# Patient Record
Sex: MALE | Race: WHITE | Employment: PART TIME | URBAN - METROPOLITAN AREA
[De-identification: names, ages, dates, MRNs, and addresses within clinical notes are randomized per-mention and may not be internally consistent; named-entity substitution may affect disease eponyms.]

---

## 2020-01-10 ENCOUNTER — HOSPITAL ENCOUNTER (EMERGENCY)
Age: 30
Discharge: HOME | End: 2020-01-10
Attending: EMERGENCY MEDICINE

## 2020-01-10 VITALS
OXYGEN SATURATION: 98 % | SYSTOLIC BLOOD PRESSURE: 118 MMHG | TEMPERATURE: 99.9 F | WEIGHT: 210 LBS | HEART RATE: 56 BPM | DIASTOLIC BLOOD PRESSURE: 73 MMHG | BODY MASS INDEX: 27.83 KG/M2 | HEIGHT: 73 IN | RESPIRATION RATE: 16 BRPM

## 2020-01-10 LAB
A/G RATIO: 1.6 (ref 1.1–2.2)
ALBUMIN SERPL-MCNC: 4.4 G/DL (ref 3.4–5)
ALP BLD-CCNC: 49 U/L (ref 40–129)
ALT SERPL-CCNC: 16 U/L (ref 10–40)
ANION GAP SERPL CALCULATED.3IONS-SCNC: 13 MMOL/L (ref 3–16)
AST SERPL-CCNC: 47 U/L (ref 15–37)
BASOPHILS ABSOLUTE: 0 K/UL (ref 0–0.2)
BASOPHILS RELATIVE PERCENT: 0.3 %
BILIRUB SERPL-MCNC: 0.3 MG/DL (ref 0–1)
BILIRUBIN URINE: NEGATIVE
BLOOD, URINE: NEGATIVE
BUN BLDV-MCNC: 9 MG/DL (ref 7–20)
CALCIUM SERPL-MCNC: 8.4 MG/DL (ref 8.3–10.6)
CHLORIDE BLD-SCNC: 93 MMOL/L (ref 99–110)
CLARITY: CLEAR
CO2: 26 MMOL/L (ref 21–32)
COLOR: ABNORMAL
CREAT SERPL-MCNC: 0.8 MG/DL (ref 0.9–1.3)
EOSINOPHILS ABSOLUTE: 0 K/UL (ref 0–0.6)
EOSINOPHILS RELATIVE PERCENT: 0.1 %
GFR AFRICAN AMERICAN: >60
GFR NON-AFRICAN AMERICAN: >60
GLOBULIN: 2.7 G/DL
GLUCOSE BLD-MCNC: 93 MG/DL (ref 70–99)
GLUCOSE URINE: NEGATIVE MG/DL
HCT VFR BLD CALC: 37.3 % (ref 40.5–52.5)
HEMOGLOBIN: 12.7 G/DL (ref 13.5–17.5)
KETONES, URINE: 15 MG/DL
LEUKOCYTE ESTERASE, URINE: NEGATIVE
LIPASE: 21 U/L (ref 13–60)
LYMPHOCYTES ABSOLUTE: 0.6 K/UL (ref 1–5.1)
LYMPHOCYTES RELATIVE PERCENT: 26.7 %
MCH RBC QN AUTO: 29.7 PG (ref 26–34)
MCHC RBC AUTO-ENTMCNC: 34.1 G/DL (ref 31–36)
MCV RBC AUTO: 87.2 FL (ref 80–100)
MICROSCOPIC EXAMINATION: ABNORMAL
MONOCYTES ABSOLUTE: 0.3 K/UL (ref 0–1.3)
MONOCYTES RELATIVE PERCENT: 14.2 %
NEUTROPHILS ABSOLUTE: 1.2 K/UL (ref 1.7–7.7)
NEUTROPHILS RELATIVE PERCENT: 58.7 %
NITRITE, URINE: NEGATIVE
PDW BLD-RTO: 13.5 % (ref 12.4–15.4)
PH UA: 6 (ref 5–8)
PLATELET # BLD: 102 K/UL (ref 135–450)
PMV BLD AUTO: 8.2 FL (ref 5–10.5)
POTASSIUM SERPL-SCNC: 3.7 MMOL/L (ref 3.5–5.1)
PROTEIN UA: NEGATIVE MG/DL
RBC # BLD: 4.27 M/UL (ref 4.2–5.9)
SODIUM BLD-SCNC: 132 MMOL/L (ref 136–145)
SPECIFIC GRAVITY UA: <=1.005 (ref 1–1.03)
TOTAL PROTEIN: 7.1 G/DL (ref 6.4–8.2)
URINE TYPE: ABNORMAL
UROBILINOGEN, URINE: 0.2 E.U./DL
WBC # BLD: 2.1 K/UL (ref 4–11)

## 2020-01-10 PROCEDURE — 2580000003 HC RX 258: Performed by: EMERGENCY MEDICINE

## 2020-01-10 PROCEDURE — 96375 TX/PRO/DX INJ NEW DRUG ADDON: CPT

## 2020-01-10 PROCEDURE — 80053 COMPREHEN METABOLIC PANEL: CPT

## 2020-01-10 PROCEDURE — 2500000003 HC RX 250 WO HCPCS: Performed by: EMERGENCY MEDICINE

## 2020-01-10 PROCEDURE — 99283 EMERGENCY DEPT VISIT LOW MDM: CPT

## 2020-01-10 PROCEDURE — 83690 ASSAY OF LIPASE: CPT

## 2020-01-10 PROCEDURE — 81003 URINALYSIS AUTO W/O SCOPE: CPT

## 2020-01-10 PROCEDURE — 85025 COMPLETE CBC W/AUTO DIFF WBC: CPT

## 2020-01-10 PROCEDURE — 6360000002 HC RX W HCPCS: Performed by: EMERGENCY MEDICINE

## 2020-01-10 PROCEDURE — 96374 THER/PROPH/DIAG INJ IV PUSH: CPT

## 2020-01-10 RX ORDER — FAMOTIDINE 20 MG/1
20 TABLET, FILM COATED ORAL 2 TIMES DAILY PRN
Qty: 28 TABLET | Refills: 0 | Status: SHIPPED | OUTPATIENT
Start: 2020-01-10 | End: 2020-01-24

## 2020-01-10 RX ORDER — 0.9 % SODIUM CHLORIDE 0.9 %
1000 INTRAVENOUS SOLUTION INTRAVENOUS ONCE
Status: COMPLETED | OUTPATIENT
Start: 2020-01-10 | End: 2020-01-10

## 2020-01-10 RX ORDER — ONDANSETRON 4 MG/1
4 TABLET, FILM COATED ORAL 3 TIMES DAILY PRN
Qty: 15 TABLET | Refills: 0 | Status: SHIPPED | OUTPATIENT
Start: 2020-01-10

## 2020-01-10 RX ORDER — ONDANSETRON 2 MG/ML
4 INJECTION INTRAMUSCULAR; INTRAVENOUS ONCE
Status: COMPLETED | OUTPATIENT
Start: 2020-01-10 | End: 2020-01-10

## 2020-01-10 RX ADMIN — ONDANSETRON 4 MG: 2 INJECTION INTRAMUSCULAR; INTRAVENOUS at 21:11

## 2020-01-10 RX ADMIN — FAMOTIDINE 20 MG: 10 INJECTION, SOLUTION INTRAVENOUS at 21:11

## 2020-01-10 RX ADMIN — SODIUM CHLORIDE 1000 ML: 9 INJECTION, SOLUTION INTRAVENOUS at 21:11

## 2020-01-11 NOTE — ED PROVIDER NOTES
Value    WBC 2.1 (*)     Hemoglobin 12.7 (*)     Hematocrit 37.3 (*)     Platelets 611 (*)     Neutrophils Absolute 1.2 (*)     Lymphocytes Absolute 0.6 (*)     All other components within normal limits    Narrative:     Performed at:  [address], [facility][note 3] [employer]   Phone [phone]   COMPREHENSIVE METABOLIC PANEL - Abnormal; Notable for the following components:    Sodium 132 (*)     Chloride 93 (*)     CREATININE 0.8 (*)     AST 47 (*)     All other components within normal limits    Narrative:     Performed at:  [place] [address] [employer]   Phone [phone]   URINALYSIS - Abnormal; Notable for the following components:    Ketones, Urine 15 (*)     All other components within normal limits    Narrative:     Performed at:  [address], [facility][note 3] [employer]   Phone [phone]   LIPASE    Narrative:     Performed at:  [address], [facility][note 3] [employer]   Phone [phone]       Interpretation per the Radiologist below, if obtained/available at the time of this note:    No orders to display       All other labs/imaging were within normal range or not returned as of this dictation. EMERGENCY DEPARTMENT COURSE and DIFFERENTIAL DIAGNOSIS/MDM:   Vitals:    Vitals:    01/10/20 2004 01/10/20 2115 01/10/20 2221   BP: 119/75 109/65 118/73   Pulse: 77 62 56   Resp: 16 16 16   Temp: 99.9 °F (37.7 °C)     TempSrc: Oral     SpO2: 97% 98% 98%   Weight: 210 lb (95.3 kg)     Height: 6' 1\" (1.854 m)         Well-appearing male here today complaining primarily of loss of taste which I suspect is likely due to nasal congestion and lack of olfactory input when eating. Abdomen is soft. Labs show mild low white blood cell count but no concern or history of immunosuppression. Abdomen soft.   Tolerating oral intake. After Zofran and Pepcid feels markedly improved. Suspect viral etiology given the rhinorrhea, conjunctivitis nausea. MDM    CONSULTS     None    Critical Care:   None    REASSESSMENT          PROCEDURE     Unless otherwise noted below, none     Procedures      FINAL IMPRESSION      1. Non-intractable vomiting with nausea, unspecified vomiting type            DISPOSITION/PLAN   DISPOSITION Decision To Discharge 01/10/2020 10:28:52 PM        PATIENT REFERRED TO:  [facility]  ED  [facility] [address] [phone]    As needed      DISCHARGE MEDICATIONS:  New Prescriptions    FAMOTIDINE (PEPCID) 20 MG TABLET    Take 1 tablet by mouth 2 times daily as needed (abdominal pain)    ONDANSETRON (ZOFRAN) 4 MG TABLET    Take 1 tablet by mouth 3 times daily as needed for Nausea or Vomiting     Controlled Substances Monitoring:     No flowsheet data found.     (Please note that portions of this note were completed with a voice recognition program.  Efforts were made to edit the dictations but occasionally words are mis-transcribed.)    Lee Zambrano MD (electronically signed)  Attending Emergency Physician            Natividad Oakes MD  01/10/20 3206

## 2020-01-11 NOTE — ED NOTES
Patient is alert and oriented. Reports been nauseated and vomiting since Monday. Patient reports dry heaving and reports has restricted his food and liquid intact to try and resolve issue without results. Patient IV infusing at this time.  Patient report sour taste at present, no vomiting      Les Douglas, [address] [place] [address]  01/10/20 0245

## 2020-01-11 NOTE — ED NOTES
Patient passed PO challenge. Patient verbalized understanding of discharge instructions, medication and plan of care.  Patient able to ambulate independently     Heather Velazquez RN  01/10/20 4387

## 2023-03-31 ENCOUNTER — HOSPITAL ENCOUNTER (INPATIENT)
Age: 33
LOS: 3 days | Discharge: HOME | DRG: 201 | End: 2023-04-03
Attending: EMERGENCY MEDICINE | Admitting: INTERNAL MEDICINE
Payer: COMMERCIAL

## 2023-03-31 ENCOUNTER — APPOINTMENT (OUTPATIENT)
Dept: GENERAL RADIOLOGY | Age: 33
DRG: 201 | End: 2023-03-31
Payer: COMMERCIAL

## 2023-03-31 ENCOUNTER — APPOINTMENT (OUTPATIENT)
Dept: CT IMAGING | Age: 33
DRG: 201 | End: 2023-03-31
Payer: COMMERCIAL

## 2023-03-31 DIAGNOSIS — J93.9 PNEUMOTHORAX, UNSPECIFIED TYPE: Primary | ICD-10-CM

## 2023-03-31 LAB
ALBUMIN SERPL-MCNC: 5 G/DL (ref 3.4–5)
ALBUMIN/GLOB SERPL: 2.5 {RATIO} (ref 1.1–2.2)
ALP SERPL-CCNC: 58 U/L (ref 40–129)
ALT SERPL-CCNC: 26 U/L (ref 10–40)
ANION GAP SERPL CALCULATED.3IONS-SCNC: 9 MMOL/L (ref 3–16)
APTT BLD: 29.9 SEC (ref 22.7–35.9)
AST SERPL-CCNC: 24 U/L (ref 15–37)
BASOPHILS # BLD: 0.1 K/UL (ref 0–0.2)
BASOPHILS NFR BLD: 0.7 %
BILIRUB SERPL-MCNC: 0.4 MG/DL (ref 0–1)
BUN SERPL-MCNC: 13 MG/DL (ref 7–20)
CALCIUM SERPL-MCNC: 9.6 MG/DL (ref 8.3–10.6)
CHLORIDE SERPL-SCNC: 100 MMOL/L (ref 99–110)
CO2 SERPL-SCNC: 27 MMOL/L (ref 21–32)
CREAT SERPL-MCNC: 0.8 MG/DL (ref 0.9–1.3)
DEPRECATED RDW RBC AUTO: 14 % (ref 12.4–15.4)
EOSINOPHIL # BLD: 0.2 K/UL (ref 0–0.6)
EOSINOPHIL NFR BLD: 2.2 %
GFR SERPLBLD CREATININE-BSD FMLA CKD-EPI: >60 ML/MIN/{1.73_M2}
GLUCOSE SERPL-MCNC: 98 MG/DL (ref 70–99)
HCT VFR BLD AUTO: 39.6 % (ref 40.5–52.5)
HGB BLD-MCNC: 13.3 G/DL (ref 13.5–17.5)
INR PPP: 1.02 (ref 0.84–1.16)
LYMPHOCYTES # BLD: 1.2 K/UL (ref 1–5.1)
LYMPHOCYTES NFR BLD: 15.8 %
MCH RBC QN AUTO: 30.2 PG (ref 26–34)
MCHC RBC AUTO-ENTMCNC: 33.6 G/DL (ref 31–36)
MCV RBC AUTO: 89.8 FL (ref 80–100)
MONOCYTES # BLD: 0.5 K/UL (ref 0–1.3)
MONOCYTES NFR BLD: 6.8 %
NEUTROPHILS # BLD: 5.4 K/UL (ref 1.7–7.7)
NEUTROPHILS NFR BLD: 74.5 %
PLATELET # BLD AUTO: 188 K/UL (ref 135–450)
PMV BLD AUTO: 7.4 FL (ref 5–10.5)
POTASSIUM SERPL-SCNC: 4.2 MMOL/L (ref 3.5–5.1)
POTASSIUM SERPL-SCNC: 4.2 MMOL/L (ref 3.5–5.1)
PROT SERPL-MCNC: 7 G/DL (ref 6.4–8.2)
PROTHROMBIN TIME: 13.4 SEC (ref 11.5–14.8)
RBC # BLD AUTO: 4.42 M/UL (ref 4.2–5.9)
SODIUM SERPL-SCNC: 136 MMOL/L (ref 136–145)
WBC # BLD AUTO: 7.3 K/UL (ref 4–11)

## 2023-03-31 PROCEDURE — 6370000000 HC RX 637 (ALT 250 FOR IP): Performed by: PHYSICIAN ASSISTANT

## 2023-03-31 PROCEDURE — 0W9B30Z DRAINAGE OF LEFT PLEURAL CAVITY WITH DRAINAGE DEVICE, PERCUTANEOUS APPROACH: ICD-10-PCS | Performed by: STUDENT IN AN ORGANIZED HEALTH CARE EDUCATION/TRAINING PROGRAM

## 2023-03-31 PROCEDURE — 96374 THER/PROPH/DIAG INJ IV PUSH: CPT

## 2023-03-31 PROCEDURE — 85025 COMPLETE CBC W/AUTO DIFF WBC: CPT

## 2023-03-31 PROCEDURE — C1729 CATH, DRAINAGE: HCPCS

## 2023-03-31 PROCEDURE — 6360000002 HC RX W HCPCS: Performed by: EMERGENCY MEDICINE

## 2023-03-31 PROCEDURE — 6360000002 HC RX W HCPCS: Performed by: STUDENT IN AN ORGANIZED HEALTH CARE EDUCATION/TRAINING PROGRAM

## 2023-03-31 PROCEDURE — 80053 COMPREHEN METABOLIC PANEL: CPT

## 2023-03-31 PROCEDURE — 85730 THROMBOPLASTIN TIME PARTIAL: CPT

## 2023-03-31 PROCEDURE — 2060000000 HC ICU INTERMEDIATE R&B

## 2023-03-31 PROCEDURE — 6360000002 HC RX W HCPCS: Performed by: PHYSICIAN ASSISTANT

## 2023-03-31 PROCEDURE — 99285 EMERGENCY DEPT VISIT HI MDM: CPT

## 2023-03-31 PROCEDURE — 71046 X-RAY EXAM CHEST 2 VIEWS: CPT

## 2023-03-31 PROCEDURE — 85610 PROTHROMBIN TIME: CPT

## 2023-03-31 PROCEDURE — 77012 CT SCAN FOR NEEDLE BIOPSY: CPT

## 2023-03-31 RX ORDER — ACETAMINOPHEN 325 MG/1
650 TABLET ORAL EVERY 6 HOURS PRN
Status: DISCONTINUED | OUTPATIENT
Start: 2023-03-31 | End: 2023-04-03 | Stop reason: HOSPADM

## 2023-03-31 RX ORDER — ACETAMINOPHEN 650 MG/1
650 SUPPOSITORY RECTAL EVERY 6 HOURS PRN
Status: DISCONTINUED | OUTPATIENT
Start: 2023-03-31 | End: 2023-04-03 | Stop reason: HOSPADM

## 2023-03-31 RX ORDER — FENTANYL CITRATE 50 UG/ML
INJECTION, SOLUTION INTRAMUSCULAR; INTRAVENOUS
Status: COMPLETED | OUTPATIENT
Start: 2023-03-31 | End: 2023-03-31

## 2023-03-31 RX ORDER — MIDAZOLAM HYDROCHLORIDE 1 MG/ML
INJECTION INTRAMUSCULAR; INTRAVENOUS
Status: COMPLETED | OUTPATIENT
Start: 2023-03-31 | End: 2023-03-31

## 2023-03-31 RX ORDER — MORPHINE SULFATE 2 MG/ML
2 INJECTION, SOLUTION INTRAMUSCULAR; INTRAVENOUS ONCE
Status: COMPLETED | OUTPATIENT
Start: 2023-03-31 | End: 2023-03-31

## 2023-03-31 RX ORDER — SODIUM CHLORIDE 9 MG/ML
INJECTION, SOLUTION INTRAVENOUS PRN
Status: DISCONTINUED | OUTPATIENT
Start: 2023-03-31 | End: 2023-04-03 | Stop reason: HOSPADM

## 2023-03-31 RX ORDER — ENOXAPARIN SODIUM 100 MG/ML
40 INJECTION SUBCUTANEOUS DAILY
Status: DISCONTINUED | OUTPATIENT
Start: 2023-03-31 | End: 2023-04-03 | Stop reason: HOSPADM

## 2023-03-31 RX ORDER — ONDANSETRON 2 MG/ML
4 INJECTION INTRAMUSCULAR; INTRAVENOUS EVERY 6 HOURS PRN
Status: DISCONTINUED | OUTPATIENT
Start: 2023-03-31 | End: 2023-04-03 | Stop reason: HOSPADM

## 2023-03-31 RX ORDER — SODIUM CHLORIDE 0.9 % (FLUSH) 0.9 %
5-40 SYRINGE (ML) INJECTION EVERY 12 HOURS SCHEDULED
Status: DISCONTINUED | OUTPATIENT
Start: 2023-03-31 | End: 2023-04-03 | Stop reason: HOSPADM

## 2023-03-31 RX ORDER — ONDANSETRON 4 MG/1
4 TABLET, ORALLY DISINTEGRATING ORAL EVERY 8 HOURS PRN
Status: DISCONTINUED | OUTPATIENT
Start: 2023-03-31 | End: 2023-04-03 | Stop reason: HOSPADM

## 2023-03-31 RX ORDER — SODIUM CHLORIDE 0.9 % (FLUSH) 0.9 %
5-40 SYRINGE (ML) INJECTION PRN
Status: DISCONTINUED | OUTPATIENT
Start: 2023-03-31 | End: 2023-04-03 | Stop reason: HOSPADM

## 2023-03-31 RX ORDER — POLYETHYLENE GLYCOL 3350 17 G/17G
17 POWDER, FOR SOLUTION ORAL DAILY PRN
Status: DISCONTINUED | OUTPATIENT
Start: 2023-03-31 | End: 2023-04-03 | Stop reason: HOSPADM

## 2023-03-31 RX ADMIN — ACETAMINOPHEN 650 MG: 325 TABLET ORAL at 18:28

## 2023-03-31 RX ADMIN — FENTANYL CITRATE 25 MCG: 50 INJECTION, SOLUTION INTRAMUSCULAR; INTRAVENOUS at 15:42

## 2023-03-31 RX ADMIN — FENTANYL CITRATE 25 MCG: 50 INJECTION, SOLUTION INTRAMUSCULAR; INTRAVENOUS at 15:40

## 2023-03-31 RX ADMIN — MORPHINE SULFATE 2 MG: 2 INJECTION, SOLUTION INTRAMUSCULAR; INTRAVENOUS at 11:28

## 2023-03-31 RX ADMIN — ENOXAPARIN SODIUM 40 MG: 100 INJECTION SUBCUTANEOUS at 17:11

## 2023-03-31 RX ADMIN — MIDAZOLAM HYDROCHLORIDE 0.5 MG: 2 INJECTION, SOLUTION INTRAMUSCULAR; INTRAVENOUS at 15:42

## 2023-03-31 RX ADMIN — MIDAZOLAM HYDROCHLORIDE 0.5 MG: 2 INJECTION, SOLUTION INTRAMUSCULAR; INTRAVENOUS at 15:40

## 2023-03-31 RX ADMIN — MIDAZOLAM HYDROCHLORIDE 1 MG: 2 INJECTION, SOLUTION INTRAMUSCULAR; INTRAVENOUS at 15:34

## 2023-03-31 RX ADMIN — FENTANYL CITRATE 50 MCG: 50 INJECTION, SOLUTION INTRAMUSCULAR; INTRAVENOUS at 15:34

## 2023-03-31 ASSESSMENT — PAIN DESCRIPTION - ORIENTATION: ORIENTATION: LEFT;POSTERIOR

## 2023-03-31 ASSESSMENT — PAIN SCALES - GENERAL
PAINLEVEL_OUTOF10: 0
PAINLEVEL_OUTOF10: 3
PAINLEVEL_OUTOF10: 3
PAINLEVEL_OUTOF10: 2
PAINLEVEL_OUTOF10: 6

## 2023-03-31 ASSESSMENT — PAIN DESCRIPTION - FREQUENCY: FREQUENCY: INTERMITTENT

## 2023-03-31 ASSESSMENT — PAIN DESCRIPTION - ONSET: ONSET: ON-GOING

## 2023-03-31 ASSESSMENT — PAIN - FUNCTIONAL ASSESSMENT
PAIN_FUNCTIONAL_ASSESSMENT: PREVENTS OR INTERFERES SOME ACTIVE ACTIVITIES AND ADLS
PAIN_FUNCTIONAL_ASSESSMENT: 0-10

## 2023-03-31 ASSESSMENT — PAIN DESCRIPTION - PAIN TYPE: TYPE: ACUTE PAIN

## 2023-03-31 ASSESSMENT — PAIN DESCRIPTION - DESCRIPTORS: DESCRIPTORS: ACHING;DISCOMFORT

## 2023-03-31 ASSESSMENT — PAIN DESCRIPTION - LOCATION: LOCATION: CHEST

## 2023-03-31 NOTE — PRE SEDATION
Sedation Pre-Procedure Note    Patient Name: Eva Ruiz   YOB: 1990  Room/Bed: 03/03  Medical Record Number: [id number]  Date: 3/31/2023   Time: 3:26 PM       Indication:  pt with moderate left pneumothorax here for left chest tube placement. Consent: I have discussed with the patient and/or the patient representative the indication, alternatives, and the possible risks and/or complications of the planned procedure and the anesthesia methods. The patient and/or patient representative appear to understand and agree to proceed. Vital Signs:   Vitals:    03/31/23 1402   BP: 109/69   Pulse: 68   Resp: 14   Temp:    SpO2: 100%       Past Medical History:   has no past medical history on file. Past Surgical History:   has no past surgical history on file. Medications:   Scheduled Meds:   Continuous Infusions:   PRN Meds:   Home Meds:   Prior to Admission medications    Medication Sig Start Date End Date Taking? Authorizing Provider   ondansetron (ZOFRAN) 4 MG tablet Take 1 tablet by mouth 3 times daily as needed for Nausea or Vomiting  Patient not taking: Reported on 3/31/2023 1/10/20   Julianna Martínez MD   famotidine (PEPCID) 20 MG tablet Take 1 tablet by mouth 2 times daily as needed (abdominal pain) 1/10/20 1/24/20  Julianna Martínez MD     Coumadin Use Last 7 Days:  no  Antiplatelet drug therapy use last 7 days: no  Other anticoagulant use last 7 days: no  Additional Medication Information:  none     Pre-Sedation Documentation and Exam:   I have reviewed the patient's history and review of systems.     Mallampati Airway Assessment:  Mallampati Class II - (soft palate, fauces & uvula are visible)    Prior History of Anesthesia Complications:   none    ASA Classification:  Class 3 - A patient with severe systemic disease that limits activity but is not incapacitating    Sedation/ Anesthesia Plan:   intravenous sedation    Medications Planned:   midazolam (Versed) intravenously and fentanyl

## 2023-03-31 NOTE — OR NURSING
Pt arrived for image guided chest tube insertion left . Procedure explained including the risk and benefits of the procedure. All questions answered. Pt verbalizes understanding of the procedure and states no more questions. Consent confirmed. Vital signs stable. Labs, allergies, medications, and code status reviewed. No contraindications noted. Vital Signs  Vitals:    03/31/23 1528   BP: 120/64   Pulse: 65   Resp: 12   Temp:    SpO2: 94%    (vital signs in table format)    Pre Sharon Score  2 - Able to move 4 extremities voluntarily on command  2 - BP+/- 20mmHg of normal  2 - Fully awake  2 - Able to maintain oxygen saturation >92% on room air  2 - Able to breathe deeply and cough freely    Allergies  Patient has no known allergies.  (allergies)    Labs  Lab Results   Component Value Date    INR 1.02 03/31/2023    PROTIME 13.4 03/31/2023     Lab Results   Component Value Date    CREATININE 0.8 (L) 03/31/2023    BUN 13 03/31/2023     03/31/2023    K 4.2 03/31/2023    K 4.2 03/31/2023     03/31/2023    CO2 27 03/31/2023     Lab Results   Component Value Date    WBC 7.3 03/31/2023    HGB 13.3 (L) 03/31/2023    HCT 39.6 (L) 03/31/2023    MCV 89.8 03/31/2023     03/31/2023

## 2023-03-31 NOTE — ED PROVIDER NOTES
I independently examined and evaluated Maximilian Castellano. In brief, is a 80-year-old male presents emergency department for evaluation of shortness of breath. Patient reports having history of pneumothorax. Started having left-sided pain and shortness of breath today. On my examination, patient is resting comfortably. No increased work of breathing. Satting 90% on room air. Chest x-ray shows left-sided apical pneumothorax. Thorax is small. It is in the apical region and will be difficult to access as it is by the subclavian. CT surgery initially consulted who recommended consulting IR. Spoke with IR and they are at [place] and will not be available. Given this, hospitalist consulted for admission for further evaluation and treatment. Admit. ICD-10-CM    1. Pneumothorax, unspecified type  J93.9           All diagnostic, treatment, and disposition decisions were made by myself in conjunction with the advanced practice provider/resident physician. I personally saw the patient and performed a substantive portion of the visit including aspects of the medical decision making. I personally saw the patient and independently provided 10 minutes of non-concurrent critical care out of the total shared critical care time provided. Comment: Please note this report has been produced using speech recognition software and may contain errors related to that system including errors in grammar, punctuation, and spelling, as well as words and phrases that may be inappropriate. If there are any questions or concerns please feel free to contact the dictating provider for clarification. For all further details of the patient's emergency department visit, please see the advanced practice provider's documentation.        Alec Ruiz MD  03/31/23 8537

## 2023-03-31 NOTE — PLAN OF CARE
Received ED page for admission.   Sent to [facility] admitting hospitalist [place]

## 2023-03-31 NOTE — LETTER
April 3, 2023       Tee Shukla YOB: 1990   [address] [facility] Date of Visit:  3/31/2023       To Whom It May Concern: It is my medical opinion that Tee Shukla may return to work on 4/10/2023 . If you have any questions or concerns, please don't hesitate to call.     Sincerely,      Jose Mari RN

## 2023-03-31 NOTE — OR NURSING
Image guided chest tube insertion left completed. Dr. Abraham Lynn placed 8 Peruvian 25 cm Angiodynamics Exodus Array multipurpose drainage catheter LOT # [id number] EXP 4/30/2025 in the left. Drain/tube secured at the 18 cm line with SUTURES. Drain/tube dressing clean, dry, and intact. Pt tolerated procedure without any signs or symptoms of distress. Vital signs stable. Report called to  RN. Pt transported back to ED in stable condition via bed by THIS RN.      Medications  Versed: 2 mg  Fentanyl: 100 mcg    Vital Signs  Vitals:    03/31/23 1549   BP: 126/72   Pulse: 53   Resp: 10   Temp:    SpO2: 100%    (vital signs in table format)    Post Sharon  2 - Able to move 4 extremities voluntarily on command  2 - BP+/- 20mmHg of normal  2 - Fully awake  2 - Able to maintain oxygen saturation >92% on room air  2 - Able to breathe deeply and cough freely

## 2023-03-31 NOTE — H&P
Hospital Medicine History & Physical      PCP: No primary care provider on file. Date of Admission: 3/31/2023    Date of Service: Pt seen/examined on 3/31/2023 and Admitted to Inpatient with expected LOS greater than two midnights due to medical therapy. Chief Complaint:    Shortness of breath      History Of Present Illness:   35 y.o. male who presented to [facility] with complaints of shortness of breath. Patient states he progressively developed shortness of breath this morning. He thought he pulled a muscle along his left chest wall at work. Chest x-ray in ED revealed a 3 cm apical pneumothorax. CT surgery and IR consulted for possible chest tube placement. Patient reports he has had a previous pneumothorax on the same side about 2 years ago after heavy lifting at work. He states the pain feels similar to his last pneumothorax. pleuritic pain. Rates it as 6 out of 10. Currently on room air of oxygen saturations in high 90s. Still smokes a pack a day. Denies fevers, chills, chest pain, lightheaded or dizziness, abdominal pain, nausea or vomiting, or focal neurologic deficit. Past Medical History:      History reviewed. No pertinent past medical history. Past Surgical History:      History reviewed. No pertinent surgical history. Medications Prior to Admission:      Prior to Admission medications    Medication Sig Start Date End Date Taking? Authorizing Provider   ondansetron (ZOFRAN) 4 MG tablet Take 1 tablet by mouth 3 times daily as needed for Nausea or Vomiting  Patient not taking: Reported on 3/31/2023 1/10/20   Shaq Pate MD   famotidine (PEPCID) 20 MG tablet Take 1 tablet by mouth 2 times daily as needed (abdominal pain) 1/10/20 1/24/20  Shaq Pate MD       Allergies:  Patient has no known allergies. Social History:      The patient currently lives home    TOBACCO:   reports that he has been smoking cigarettes.  He has been smoking an average of .5 packs

## 2023-03-31 NOTE — ED PROVIDER NOTES
Emergency Department Provider Note  Location: [facility]  ED  3/31/2023     Patient Name: Elmira Lange  MRN: [id number]  YOB: 1990  Date of evaluation: 3/31/2023  Provider: Karla Knapp MD  PCP: No primary care provider on file. CHIEF COMPLAINT  Shortness of Breath (Left side pain, Hx of pneumothorax)       HISTORY & PHYSICAL     HISTORY OF PRESENT ILLNESS  Elmira Lange is a 35 y.o. male with a past medical history of pneumothorax 2 years ago, who presents to the ED complaining of left-sided pleuritic chest pain. He notably had his last pneumothorax while at work doing heavy lifting. 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 Patient reports he felt like he \"pulled a muscle\" of his left side, and that the pain is very similar to pain he had with his previous pneumothorax. Patient woke up this morning reporting of numbness of skin of his left chest with slight trouble breathing. Numbness has since improved but pain persist.  Pain is worsened when patient leans forward as well as takes a deep breath. Pain is localized to his upper left chest.  Does report some pain in his back but this is not as severe as his chest pain. At rest, patient reports no pain. However when pain does occur, it is sharp and uncomfortable, rated as a 5 out of 10. He has not taken any medication for his pain. Denies fever, chills, headache, nausea, vomiting, dysuria, Marek frequency, constipation or diarrhea. Denies history of blood clots or active malignancy. Patient denies unilateral leg swelling, hemoptysis, recent travel or surgery/immobilization, or hormone use. They report that their most recent cardiac evaluation was: never    Patient does smoke.      Old records reviewed:  Hospitalized August 2020 for spontaneous pneumothorax at [address] [place] [address], chest tube placement by CT surgery and chest tube

## 2023-03-31 NOTE — BRIEF OP NOTE
Brief Postoperative Note    Jef Burciaga  YOB: 1990  [id number]    Pre-operative Diagnosis: spontaneous pneumothorax    Post-operative Diagnosis: Same    Procedure: image guided left chest tube placement    Anesthesia: Local and Moderate Sedation    Surgeons/Assistants: Praveen Robertson MD    Estimated Blood Loss: less than 5    Complications: None    Specimens: Was Not Obtained    Findings: successful placement of left 8F chest tube into moderate sized left pneumothorax    Electronically signed by Praveen Robertson MD on 3/31/2023 at 3:52 PM

## 2023-04-01 ENCOUNTER — APPOINTMENT (OUTPATIENT)
Dept: GENERAL RADIOLOGY | Age: 33
DRG: 201 | End: 2023-04-01
Payer: COMMERCIAL

## 2023-04-01 LAB
ANION GAP SERPL CALCULATED.3IONS-SCNC: 10 MMOL/L (ref 3–16)
BASOPHILS # BLD: 0 K/UL (ref 0–0.2)
BASOPHILS NFR BLD: 0.7 %
BUN SERPL-MCNC: 15 MG/DL (ref 7–20)
CALCIUM SERPL-MCNC: 8.8 MG/DL (ref 8.3–10.6)
CHLORIDE SERPL-SCNC: 102 MMOL/L (ref 99–110)
CO2 SERPL-SCNC: 28 MMOL/L (ref 21–32)
CREAT SERPL-MCNC: 1 MG/DL (ref 0.9–1.3)
DEPRECATED RDW RBC AUTO: 14.2 % (ref 12.4–15.4)
EOSINOPHIL # BLD: 0.3 K/UL (ref 0–0.6)
EOSINOPHIL NFR BLD: 3.9 %
GFR SERPLBLD CREATININE-BSD FMLA CKD-EPI: >60 ML/MIN/{1.73_M2}
GLUCOSE SERPL-MCNC: 107 MG/DL (ref 70–99)
HCT VFR BLD AUTO: 38.1 % (ref 40.5–52.5)
HGB BLD-MCNC: 12.8 G/DL (ref 13.5–17.5)
LYMPHOCYTES # BLD: 2.3 K/UL (ref 1–5.1)
LYMPHOCYTES NFR BLD: 35.6 %
MCH RBC QN AUTO: 30.3 PG (ref 26–34)
MCHC RBC AUTO-ENTMCNC: 33.7 G/DL (ref 31–36)
MCV RBC AUTO: 90.1 FL (ref 80–100)
MONOCYTES # BLD: 0.5 K/UL (ref 0–1.3)
MONOCYTES NFR BLD: 7.5 %
NEUTROPHILS # BLD: 3.4 K/UL (ref 1.7–7.7)
NEUTROPHILS NFR BLD: 52.3 %
PLATELET # BLD AUTO: 177 K/UL (ref 135–450)
PMV BLD AUTO: 8.2 FL (ref 5–10.5)
POTASSIUM SERPL-SCNC: 3.7 MMOL/L (ref 3.5–5.1)
RBC # BLD AUTO: 4.22 M/UL (ref 4.2–5.9)
SODIUM SERPL-SCNC: 140 MMOL/L (ref 136–145)
WBC # BLD AUTO: 6.6 K/UL (ref 4–11)

## 2023-04-01 PROCEDURE — 36415 COLL VENOUS BLD VENIPUNCTURE: CPT

## 2023-04-01 PROCEDURE — 2060000000 HC ICU INTERMEDIATE R&B

## 2023-04-01 PROCEDURE — 85025 COMPLETE CBC W/AUTO DIFF WBC: CPT

## 2023-04-01 PROCEDURE — 80048 BASIC METABOLIC PNL TOTAL CA: CPT

## 2023-04-01 PROCEDURE — 71046 X-RAY EXAM CHEST 2 VIEWS: CPT

## 2023-04-01 PROCEDURE — 6360000002 HC RX W HCPCS: Performed by: PHYSICIAN ASSISTANT

## 2023-04-01 PROCEDURE — 2580000003 HC RX 258: Performed by: PHYSICIAN ASSISTANT

## 2023-04-01 RX ADMIN — SODIUM CHLORIDE, PRESERVATIVE FREE 10 ML: 5 INJECTION INTRAVENOUS at 08:26

## 2023-04-01 RX ADMIN — SODIUM CHLORIDE, PRESERVATIVE FREE 10 ML: 5 INJECTION INTRAVENOUS at 21:37

## 2023-04-01 RX ADMIN — ENOXAPARIN SODIUM 40 MG: 100 INJECTION SUBCUTANEOUS at 08:25

## 2023-04-01 NOTE — PLAN OF CARE
Problem: Discharge Planning  Goal: Discharge to home or other facility with appropriate resources  Outcome: Progressing  Flowsheets (Taken 3/31/2023 1615 by Nataliya Hill, RN)  Discharge to home or other facility with appropriate resources: Identify barriers to discharge with patient and caregiver     Problem: Pain  Goal: Verbalizes/displays adequate comfort level or baseline comfort level  3/31/2023 2319 by Kai Almaraz RN  Outcome: Progressing  3/31/2023 1629 by Nataliya Hill RN  Outcome: Progressing     Problem: Safety - Adult  Goal: Free from fall injury  3/31/2023 2319 by Kai Almaraz RN  Outcome: Progressing  3/31/2023 1629 by Nataliya Hill RN  Outcome: Progressing

## 2023-04-01 NOTE — PLAN OF CARE
Problem: Discharge Planning  Goal: Discharge to home or other facility with appropriate resources  4/1/2023 0947 by David Romo RN  Outcome: Progressing  3/31/2023 2319 by Skylar Melendez RN  Outcome: Progressing  Flowsheets (Taken 3/31/2023 1615 by Nyla Cortez RN)  Discharge to home or other facility with appropriate resources: Identify barriers to discharge with patient and caregiver     Problem: Pain  Goal: Verbalizes/displays adequate comfort level or baseline comfort level  4/1/2023 0947 by David Romo RN  Outcome: Progressing  3/31/2023 2319 by Skylar Melenedz RN  Outcome: Progressing     Problem: Safety - Adult  Goal: Free from fall injury  4/1/2023 0947 by David Romo RN  Outcome: Progressing  3/31/2023 2319 by Skylar Melendez RN  Outcome: Progressing

## 2023-04-01 NOTE — FLOWSHEET NOTE
04/01/23 0822   Vitals   Temp 98.1 °F (36.7 °C)   Temp Source Oral   Heart Rate 60   Heart Rate Source Monitor   Resp 17   BP 98/60   MAP (Calculated) 73   MAP (mmHg) 73   BP Location Left upper arm   BP Upper/Lower Upper   BP Method Automatic   Patient Position Sitting   Level of Consciousness 0   MEWS Score 2   Cardiac Rhythm Sinus adri   Pain Assessment   Pain Assessment None - Denies Pain   Oxygen Therapy   SpO2 100 %   Pulse Oximetry Type Intermittent   Pulse Oximeter Device Mode Intermittent   Pulse Oximeter Device Location Finger   O2 Device None (Room air)     Pt A/O x4, follows commands and responds appropriately. Was resting when RN entered room. Denies any pain at this time. CT dressing C/D/I, to LWS at -20. No s/s of distress, VSS. See flowsheet and eMar for additional documentation.

## 2023-04-02 ENCOUNTER — APPOINTMENT (OUTPATIENT)
Dept: GENERAL RADIOLOGY | Age: 33
DRG: 201 | End: 2023-04-02
Payer: COMMERCIAL

## 2023-04-02 PROCEDURE — 6360000002 HC RX W HCPCS: Performed by: PHYSICIAN ASSISTANT

## 2023-04-02 PROCEDURE — 71045 X-RAY EXAM CHEST 1 VIEW: CPT

## 2023-04-02 PROCEDURE — 2580000003 HC RX 258: Performed by: PHYSICIAN ASSISTANT

## 2023-04-02 PROCEDURE — 2060000000 HC ICU INTERMEDIATE R&B

## 2023-04-02 RX ADMIN — SODIUM CHLORIDE, PRESERVATIVE FREE 10 ML: 5 INJECTION INTRAVENOUS at 20:31

## 2023-04-02 RX ADMIN — SODIUM CHLORIDE, PRESERVATIVE FREE 10 ML: 5 INJECTION INTRAVENOUS at 09:30

## 2023-04-02 RX ADMIN — ENOXAPARIN SODIUM 40 MG: 100 INJECTION SUBCUTANEOUS at 09:30

## 2023-04-02 ASSESSMENT — PAIN SCALES - GENERAL: PAINLEVEL_OUTOF10: 0

## 2023-04-02 NOTE — PLAN OF CARE
Problem: Pain  Goal: Verbalizes/displays adequate comfort level or baseline comfort level  4/2/2023 0906 by Sully Marte RN  Outcome: Progressing  Note: Pt will be satisfied with pain control. Pt uses numeric pain rating scale with reassessments after pain med administration. Will continue to monitor progression throughout shift. Problem: Safety - Adult  Goal: Free from fall injury  4/2/2023 0906 by Sully Marte RN  Outcome: Progressing  Note: Pt will remain free from falls throughout hospital stay. Fall precautions in place,  bed in lowest position with wheels locked and side rails 2/4 up. Room door open and hourly rounding completed. Will continue to monitor throughout shift.

## 2023-04-02 NOTE — PLAN OF CARE
Problem: Discharge Planning  Goal: Discharge to home or other facility with appropriate resources  4/1/2023 2153 by Junior Prabhakar RN  Outcome: Progressing  Flowsheets (Taken 4/1/2023 2153)  Discharge to home or other facility with appropriate resources:   Identify barriers to discharge with patient and caregiver   Identify discharge learning needs (meds, wound care, etc)   Refer to discharge planning if patient needs post-hospital services based on physician order or complex needs related to functional status, cognitive ability or social support system   Arrange for needed discharge resources and transportation as appropriate     Problem: Pain  Goal: Verbalizes/displays adequate comfort level or baseline comfort level  4/1/2023 2153 by Junior Prabhakar RN  Outcome: Progressing  Note: Pt reporting  0 /10 pain during pain assessment. Treating pain prn. Pt reports current pain plan is working well. Instructed pt to report any new onsets of pain. Will continue to monitor. Problem: Safety - Adult  Goal: Free from fall injury  4/1/2023 2153 by Junior Prabhakar RN  Outcome: Progressing  Note: Pt at risk for accidental injury. Pt will remain free from injury during admission. Pt educated on the use of the call light and the need to have an active bed alarm. Pt will call out prior to ambulation. Pt's pathway will remain free from tripping hazards. Pt's bed is in lowest position and call light in reach. Pt has no further needs at this time. Will continue to monitor.

## 2023-04-03 VITALS
TEMPERATURE: 97.9 F | BODY MASS INDEX: 27.83 KG/M2 | DIASTOLIC BLOOD PRESSURE: 66 MMHG | HEART RATE: 60 BPM | RESPIRATION RATE: 16 BRPM | SYSTOLIC BLOOD PRESSURE: 114 MMHG | WEIGHT: 210 LBS | HEIGHT: 73 IN | OXYGEN SATURATION: 99 %

## 2023-04-03 PROCEDURE — 6360000002 HC RX W HCPCS: Performed by: PHYSICIAN ASSISTANT

## 2023-04-03 PROCEDURE — 2580000003 HC RX 258: Performed by: PHYSICIAN ASSISTANT

## 2023-04-03 RX ADMIN — ENOXAPARIN SODIUM 40 MG: 100 INJECTION SUBCUTANEOUS at 09:33

## 2023-04-03 RX ADMIN — SODIUM CHLORIDE, PRESERVATIVE FREE 10 ML: 5 INJECTION INTRAVENOUS at 09:34

## 2023-04-03 NOTE — DISCHARGE SUMMARY
thoracostomy tube placement, now trace. CT GUIDED PLEURAL DRAINAGE W [facility]   Final Result   Successful CT guided placement of a left 8 French chest tube. CT GUIDED NEEDLE PLACEMENT   Final Result   Successful CT guided placement of a left 8 French chest tube. XR CHEST (2 VW)   Final Result   Left-sided pneumothorax as described above. No tension morphology at this   time. XR CHEST STANDARD (2 VW)    (Results Pending)        Consults:     IP CONSULT TO CARDIOTHORACIC SURGERY  IP CONSULT TO INTERVENTIONAL RADIOLOGY  IP CONSULT TO HOSPITALIST    Disposition: home     Condition at Discharge: Stable    Discharge Instructions/Follow-up:  Follow up with PCP in 7-10 days. CXR in 2 days    Code Status:Full Code     Activity: activity as tolerated    Diet: regular diet      Discharge Medications:     Current Discharge Medication List             Details   famotidine (PEPCID) 20 MG tablet Take 1 tablet by mouth 2 times daily as needed (abdominal pain)  Qty: 28 tablet, Refills: 0             Time Spent on discharge: 37 minutes in the examination, evaluation, counseling and review of medications and discharge plan. Signed:    WIL Kessler CNP   4/3/2023      Thank you No primary care provider on file. for the opportunity to be involved in this patient's care. If you have any questions or concerns, please feel free to contact me at [phone].

## 2023-04-03 NOTE — PROGRESS NOTES
Chest tube now to water seal per verbal order from [facility].
Pt was given discharge instructions alone with cxr order and they were explained to patient and significant other. Verbalized understanding and no questions. Pt refused wheelchair and walked out with significant other.
venous distention. Trachea midline. Respiratory:  Normal respiratory effort. Clear to auscultation, bilaterally without Rales/Wheezes/Rhonchi. Cardiovascular: Regular rate and rhythm with normal S1/S2 without murmurs, rubs or gallops. Abdomen: Soft, non-tender, non-distended with normal bowel sounds. Musculoskeletal: No clubbing, cyanosis or edema bilaterally. Full range of motion without deformity. Skin: Skin color, texture, turgor normal.  No rashes or lesions. Neurologic:  Neurovascularly intact without any focal sensory/motor deficits. Cranial nerves: II-XII intact, grossly non-focal.  Psychiatric: Alert and oriented, thought content appropriate, normal insight  Capillary Refill: Brisk, 3 seconds, normal   Peripheral Pulses: +2 palpable, equal bilaterally       Labs:   Recent Labs     03/31/23  1041 04/01/23  0525   WBC 7.3 6.6   HGB 13.3* 12.8*   HCT 39.6* 38.1*    177     Recent Labs     03/31/23  1041 04/01/23  0525    140   K 4.2  4.2 3.7    102   CO2 27 28   BUN 13 15   CREATININE 0.8* 1.0   CALCIUM 9.6 8.8     Recent Labs     03/31/23  1041   AST 24   ALT 26   BILITOT 0.4   ALKPHOS 58     Recent Labs     03/31/23  1041   INR 1.02     No results for input(s): Kristin Salazar in the last 72 hours. Urinalysis:      Lab Results   Component Value Date/Time    NITRU Negative 01/10/2020 08:07 PM    BLOODU Negative 01/10/2020 08:07 PM    SPECGRAV <=1.005 01/10/2020 08:07 PM    GLUCOSEU Negative 01/10/2020 08:07 PM       Radiology:  CT GUIDED PLEURAL DRAINAGE W CATH PERC   Final Result   Successful CT guided placement of a left 8 Romanian chest tube. CT GUIDED NEEDLE PLACEMENT   Final Result   Successful CT guided placement of a left 8 Romanian chest tube. XR CHEST (2 VW)   Final Result   Left-sided pneumothorax as described above. No tension morphology at this   time.          XR CHEST (2 VW)    (Results Pending)       IP CONSULT TO CARDIOTHORACIC SURGERY  IP CONSULT TO
PLACEMENT   Final Result   Successful CT guided placement of a left 8 Eritrean chest tube. XR CHEST (2 VW)   Final Result   Left-sided pneumothorax as described above. No tension morphology at this   time. IP CONSULT TO CARDIOTHORACIC SURGERY  IP CONSULT TO INTERVENTIONAL RADIOLOGY  IP CONSULT TO HOSPITALIST    Assessment/Plan:    Active Hospital Problems    Diagnosis     Pneumothorax [J93.9]      Left 3 cm nontraumatic apical pneumothorax  -Continuous pulse ox, supplemental O2 if needed  -Chest x-ray this morning shows almost complete resolution of pneumothorax, place chest tube to waterseal we will monitor over the next 24 hours, no air leak noted at this time, likely can be removed tomorrow  -IR placed chest tube on 3/31/2023, improved on follow-up imaging  -Pain control bowel regimen     Tobacco abuse  -Counseled on cessation    DVT Prophylaxis: Lovenox  Diet: ADULT DIET;  Regular  Code Status: Full Code  PT/OT Eval Status: Not ordered    Dispo - likely tomorrow, chest tube placed waterseal today    Appropriate for A1 Discharge Unit: NANDINI Dowling

## 2024-10-01 ENCOUNTER — HOSPITAL ENCOUNTER (EMERGENCY)
Age: 34
Discharge: HOME | End: 2024-10-01
Attending: STUDENT IN AN ORGANIZED HEALTH CARE EDUCATION/TRAINING PROGRAM
Payer: COMMERCIAL

## 2024-10-01 ENCOUNTER — APPOINTMENT (OUTPATIENT)
Dept: GENERAL RADIOLOGY | Age: 34
End: 2024-10-01
Payer: COMMERCIAL

## 2024-10-01 VITALS
HEART RATE: 67 BPM | OXYGEN SATURATION: 99 % | RESPIRATION RATE: 10 BRPM | TEMPERATURE: 98.3 F | DIASTOLIC BLOOD PRESSURE: 65 MMHG | SYSTOLIC BLOOD PRESSURE: 119 MMHG

## 2024-10-01 DIAGNOSIS — R06.00 DYSPNEA, UNSPECIFIED TYPE: ICD-10-CM

## 2024-10-01 DIAGNOSIS — R07.9 CHEST PAIN, UNSPECIFIED TYPE: Primary | ICD-10-CM

## 2024-10-01 LAB
ALBUMIN SERPL-MCNC: 4.6 G/DL (ref 3.4–5)
ALBUMIN/GLOB SERPL: 1.7 {RATIO} (ref 1.1–2.2)
ALP SERPL-CCNC: 76 U/L (ref 40–129)
ALT SERPL-CCNC: 45 U/L (ref 10–40)
ANION GAP SERPL CALCULATED.3IONS-SCNC: 12 MMOL/L (ref 3–16)
AST SERPL-CCNC: 31 U/L (ref 15–37)
BASOPHILS # BLD: 0 K/UL (ref 0–0.2)
BASOPHILS NFR BLD: 0.6 %
BILIRUB SERPL-MCNC: 0.3 MG/DL (ref 0–1)
BUN SERPL-MCNC: 16 MG/DL (ref 7–20)
CALCIUM SERPL-MCNC: 9.6 MG/DL (ref 8.3–10.6)
CHLORIDE SERPL-SCNC: 101 MMOL/L (ref 99–110)
CO2 SERPL-SCNC: 27 MMOL/L (ref 21–32)
CREAT SERPL-MCNC: 0.8 MG/DL (ref 0.9–1.3)
D-DIMER QUANTITATIVE: <0.27 UG/ML FEU (ref 0–0.6)
DEPRECATED RDW RBC AUTO: 13.9 % (ref 12.4–15.4)
EKG ATRIAL RATE: 62 BPM
EKG DIAGNOSIS: NORMAL
EKG P AXIS: 50 DEGREES
EKG P-R INTERVAL: 150 MS
EKG Q-T INTERVAL: 442 MS
EKG QRS DURATION: 104 MS
EKG QTC CALCULATION (BAZETT): 448 MS
EKG R AXIS: 74 DEGREES
EKG T AXIS: 60 DEGREES
EKG VENTRICULAR RATE: 62 BPM
EOSINOPHIL # BLD: 0.2 K/UL (ref 0–0.6)
EOSINOPHIL NFR BLD: 2.5 %
GFR SERPLBLD CREATININE-BSD FMLA CKD-EPI: >90 ML/MIN/{1.73_M2}
GLUCOSE SERPL-MCNC: 126 MG/DL (ref 70–99)
HCT VFR BLD AUTO: 37.8 % (ref 40.5–52.5)
HGB BLD-MCNC: 12.5 G/DL (ref 13.5–17.5)
LYMPHOCYTES # BLD: 2.5 K/UL (ref 1–5.1)
LYMPHOCYTES NFR BLD: 31.4 %
MAGNESIUM SERPL-MCNC: 2 MG/DL (ref 1.8–2.4)
MCH RBC QN AUTO: 29.7 PG (ref 26–34)
MCHC RBC AUTO-ENTMCNC: 33.2 G/DL (ref 31–36)
MCV RBC AUTO: 89.4 FL (ref 80–100)
MONOCYTES # BLD: 0.4 K/UL (ref 0–1.3)
MONOCYTES NFR BLD: 5.6 %
NEUTROPHILS # BLD: 4.7 K/UL (ref 1.7–7.7)
NEUTROPHILS NFR BLD: 59.9 %
NT-PROBNP SERPL-MCNC: <36 PG/ML (ref 0–124)
PLATELET # BLD AUTO: 240 K/UL (ref 135–450)
PMV BLD AUTO: 7.2 FL (ref 5–10.5)
POTASSIUM SERPL-SCNC: 3.5 MMOL/L (ref 3.5–5.1)
PROT SERPL-MCNC: 7.3 G/DL (ref 6.4–8.2)
RBC # BLD AUTO: 4.23 M/UL (ref 4.2–5.9)
SODIUM SERPL-SCNC: 140 MMOL/L (ref 136–145)
TROPONIN, HIGH SENSITIVITY: <6 NG/L (ref 0–22)
TROPONIN, HIGH SENSITIVITY: <6 NG/L (ref 0–22)
WBC # BLD AUTO: 7.9 K/UL (ref 4–11)

## 2024-10-01 PROCEDURE — 85025 COMPLETE CBC W/AUTO DIFF WBC: CPT

## 2024-10-01 PROCEDURE — 36415 COLL VENOUS BLD VENIPUNCTURE: CPT

## 2024-10-01 PROCEDURE — 85379 FIBRIN DEGRADATION QUANT: CPT

## 2024-10-01 PROCEDURE — 93005 ELECTROCARDIOGRAM TRACING: CPT | Performed by: STUDENT IN AN ORGANIZED HEALTH CARE EDUCATION/TRAINING PROGRAM

## 2024-10-01 PROCEDURE — 83735 ASSAY OF MAGNESIUM: CPT

## 2024-10-01 PROCEDURE — 83880 ASSAY OF NATRIURETIC PEPTIDE: CPT

## 2024-10-01 PROCEDURE — 71045 X-RAY EXAM CHEST 1 VIEW: CPT

## 2024-10-01 PROCEDURE — 84484 ASSAY OF TROPONIN QUANT: CPT

## 2024-10-01 PROCEDURE — 93010 ELECTROCARDIOGRAM REPORT: CPT | Performed by: INTERNAL MEDICINE

## 2024-10-01 PROCEDURE — 80053 COMPREHEN METABOLIC PANEL: CPT

## 2024-10-01 PROCEDURE — 96374 THER/PROPH/DIAG INJ IV PUSH: CPT

## 2024-10-01 PROCEDURE — 99285 EMERGENCY DEPT VISIT HI MDM: CPT

## 2024-10-01 PROCEDURE — 6360000002 HC RX W HCPCS: Performed by: STUDENT IN AN ORGANIZED HEALTH CARE EDUCATION/TRAINING PROGRAM

## 2024-10-01 RX ORDER — KETOROLAC TROMETHAMINE 15 MG/ML
15 INJECTION, SOLUTION INTRAMUSCULAR; INTRAVENOUS ONCE
Status: COMPLETED | OUTPATIENT
Start: 2024-10-01 | End: 2024-10-01

## 2024-10-01 RX ADMIN — KETOROLAC TROMETHAMINE 15 MG: 15 INJECTION, SOLUTION INTRAMUSCULAR; INTRAVENOUS at 02:16

## 2024-10-01 ASSESSMENT — PAIN DESCRIPTION - DESCRIPTORS: DESCRIPTORS: TIGHTNESS

## 2024-10-01 ASSESSMENT — PAIN DESCRIPTION - LOCATION: LOCATION: CHEST

## 2024-10-01 ASSESSMENT — PAIN DESCRIPTION - ORIENTATION: ORIENTATION: RIGHT;LEFT

## 2024-10-01 ASSESSMENT — PAIN SCALES - GENERAL: PAINLEVEL_OUTOF10: 2

## 2024-10-01 NOTE — ED PROVIDER NOTES
[facility] EMERGENCY DEPARTMENT  EMERGENCY DEPARTMENT ENCOUNTER        Patient Name: Harjit Wright  MRN: [id number]  Birthdate 1990  Date of evaluation: 10/1/2024  Provider: Daiana Sanabria MD  PCP: Chloé Logan MD  Note Started: 1:19 AM EDT 10/1/24      CHIEF COMPLAINT  Shortness of Breath         HISTORY & PHYSICAL     HISTORY OF PRESENT ILLNESS  History from : Patient    Limitations to history : None    Harjit Wright is a 34 y.o. male  has no past medical history on file., who presents to the ED complaining of shortness of breath.  Patient reports this evening he was sitting watching a video game when he suddenly felt short of breath, also was having some feeling of chest discomfort across his chest.  States it feels different than when he had a pneumothorax in the past.  He denies any cough or fever.  He does report mild pleuritic nature, no other palliative or provocative factors.  He did not take anything prior to arrival..  Denies history of blood clots or active malignancy.  Patient denies unilateral leg swelling, hemoptysis, recent travel or surgery/immobilization, or OCP or other hormone use. Patient is not post partum.    Family history:   No family history on file.    They report that their most recent cardiac evaluation was: none    Patient does smoke. Patient denies cocaine or other drug use.      Old records reviewed:  Patient was admitted in March 2023 for pneumothorax    No other complaints, modifying factors or associated symptoms.  Nursing Notes were all reviewed and agreed with or any disagreements were addressed in the HPI.    I have reviewed the following from the nursing documentation.    No past medical history on file.  Past Surgical History:   Procedure Laterality Date    CT INSERT CATH PLEURA W IMAGE  3/31/2023    CT INSERT CATH PLEURA W IMAGE 3/31/2023 Mike Landaverde MD [facility] CT SCAN     No family history on file.  Social History     Socioeconomic History    Marital status: